# Patient Record
Sex: FEMALE | Race: WHITE | NOT HISPANIC OR LATINO | Employment: FULL TIME | URBAN - NONMETROPOLITAN AREA
[De-identification: names, ages, dates, MRNs, and addresses within clinical notes are randomized per-mention and may not be internally consistent; named-entity substitution may affect disease eponyms.]

---

## 2019-10-22 ENCOUNTER — TRANSCRIBE ORDERS (OUTPATIENT)
Dept: MAMMOGRAPHY | Facility: HOSPITAL | Age: 44
End: 2019-10-22

## 2019-10-22 DIAGNOSIS — Z12.39 BREAST CANCER SCREENING: Primary | ICD-10-CM

## 2019-11-04 ENCOUNTER — HOSPITAL ENCOUNTER (OUTPATIENT)
Dept: MAMMOGRAPHY | Facility: HOSPITAL | Age: 44
Discharge: HOME | End: 2019-11-04
Admitting: NURSE PRACTITIONER

## 2019-11-04 DIAGNOSIS — Z12.39 BREAST CANCER SCREENING: ICD-10-CM

## 2019-11-04 PROCEDURE — 77063 BREAST TOMOSYNTHESIS BI: CPT

## 2019-11-04 PROCEDURE — 77067 SCR MAMMO BI INCL CAD: CPT

## 2019-11-18 ENCOUNTER — TRANSCRIBE ORDERS (OUTPATIENT)
Dept: ADMINISTRATIVE | Facility: HOSPITAL | Age: 44
End: 2019-11-18

## 2019-11-18 DIAGNOSIS — R92.8 ABNORMAL MAMMOGRAM OF BOTH BREASTS: Primary | ICD-10-CM

## 2019-11-22 ENCOUNTER — HOSPITAL ENCOUNTER (OUTPATIENT)
Dept: ULTRASOUND IMAGING | Facility: HOSPITAL | Age: 44
Discharge: HOME | End: 2019-11-22
Admitting: NURSE PRACTITIONER

## 2019-11-22 DIAGNOSIS — R92.8 ABNORMAL MAMMOGRAM OF BOTH BREASTS: ICD-10-CM

## 2019-11-22 PROCEDURE — 76641 ULTRASOUND BREAST COMPLETE: CPT

## 2024-09-26 ENCOUNTER — OFFICE VISIT (OUTPATIENT)
Dept: CARDIOLOGY | Facility: CLINIC | Age: 49
End: 2024-09-26
Payer: COMMERCIAL

## 2024-09-26 VITALS
HEART RATE: 101 BPM | BODY MASS INDEX: 29.44 KG/M2 | OXYGEN SATURATION: 100 % | WEIGHT: 160 LBS | DIASTOLIC BLOOD PRESSURE: 80 MMHG | HEIGHT: 62 IN | SYSTOLIC BLOOD PRESSURE: 132 MMHG

## 2024-09-26 DIAGNOSIS — R06.09 DOE (DYSPNEA ON EXERTION): Primary | ICD-10-CM

## 2024-09-26 DIAGNOSIS — R00.2 PALPITATIONS: ICD-10-CM

## 2024-09-26 DIAGNOSIS — R03.0 ELEVATED BLOOD PRESSURE READING WITHOUT DIAGNOSIS OF HYPERTENSION: ICD-10-CM

## 2024-09-26 DIAGNOSIS — R94.31 ABNORMAL ECG: ICD-10-CM

## 2024-09-26 DIAGNOSIS — R60.0 BILATERAL LEG EDEMA: ICD-10-CM

## 2024-09-26 PROCEDURE — 99204 OFFICE O/P NEW MOD 45 MIN: CPT | Performed by: INTERNAL MEDICINE

## 2024-09-26 PROCEDURE — 93000 ELECTROCARDIOGRAM COMPLETE: CPT | Performed by: INTERNAL MEDICINE

## 2024-09-26 RX ORDER — CYCLOSPORINE 0.5 MG/ML
1 EMULSION OPHTHALMIC
COMMUNITY

## 2024-11-22 ENCOUNTER — OFFICE VISIT (OUTPATIENT)
Dept: OBSTETRICS AND GYNECOLOGY | Facility: CLINIC | Age: 49
End: 2024-11-22
Payer: COMMERCIAL

## 2024-11-22 VITALS
SYSTOLIC BLOOD PRESSURE: 114 MMHG | DIASTOLIC BLOOD PRESSURE: 68 MMHG | HEIGHT: 62 IN | BODY MASS INDEX: 29.26 KG/M2 | WEIGHT: 159 LBS

## 2024-11-22 DIAGNOSIS — Z12.4 ENCOUNTER FOR PAPANICOLAOU SMEAR FOR CERVICAL CANCER SCREENING: ICD-10-CM

## 2024-11-22 DIAGNOSIS — K76.89 LIVER CYST: ICD-10-CM

## 2024-11-22 DIAGNOSIS — Z01.419 WELL WOMAN EXAM WITH ROUTINE GYNECOLOGICAL EXAM: Primary | ICD-10-CM

## 2024-11-22 RX ORDER — AMOXICILLIN 500 MG
1 CAPSULE ORAL EVERY 12 HOURS SCHEDULED
COMMUNITY
Start: 2024-11-21

## 2024-11-22 RX ORDER — ERGOCALCIFEROL 1.25 MG/1
CAPSULE, LIQUID FILLED ORAL
COMMUNITY
Start: 2024-11-21

## 2024-11-22 RX ORDER — LIFITEGRAST 50 MG/ML
SOLUTION/ DROPS OPHTHALMIC EVERY 12 HOURS SCHEDULED
COMMUNITY

## 2024-11-22 RX ORDER — HYOSCYAMINE SULFATE 0.125 MG
TABLET ORAL
COMMUNITY

## 2024-11-26 LAB — REF LAB TEST METHOD: NORMAL

## 2024-12-05 NOTE — PROGRESS NOTES
Annual Well Woman Visit    Subjective   Chief Complaint   Patient presents with    Gynecologic Exam     Last pap 2019 WNL, Last Mammogram  ( Has order for new one)     Elba Olivia is a 49 y.o. year old  presenting to be seen for an annual well woman visit.    No complaints.      A liver cyst was noted on outside imaging and she is unsure about follow-up.    She denies sexual dysfunction. She denies urinary complaints including incontinence.    OB Hx:  x 4  Contraception: BTL  Pap smear: 2019  Mammogram:  - new one pending    Past Medical History:   Diagnosis Date    Abnormal ECG 2024    Endometriosis     Hyperlipidemia     Hypertension     IBS (irritable bowel syndrome)     Migraine 1986    Varicella      Past Surgical History:   Procedure Laterality Date    BREAST BIOPSY  2019    TUBAL ABDOMINAL LIGATION      WISDOM TOOTH EXTRACTION       Family History   Problem Relation Age of Onset    Hypertension Mother     Diabetes Paternal Grandfather     Uterine cancer Paternal Grandmother     Diabetes Paternal Grandmother      Social History     Tobacco Use    Smoking status: Never    Smokeless tobacco: Never   Vaping Use    Vaping status: Never Used   Substance Use Topics    Alcohol use: Not Currently    Drug use: Never     (Not in a hospital admission)    Patient has no known allergies.  Current Outpatient Medications on File Prior to Visit   Medication Sig Dispense Refill    Omega-3 Fatty Acids (fish oil) 1200 MG capsule capsule Take 1 capsule by mouth Every 12 (Twelve) Hours.      vitamin D (ERGOCALCIFEROL) 1.25 MG (91588 UT) capsule capsule       hyoscyamine (ANASPAZ,LEVSIN) 0.125 MG tablet Every 4 (Four) Hours.      Lifitegrast (Xiidra) 5 % ophthalmic solution Every 12 (Twelve) Hours.       No current facility-administered medications on file prior to visit.     Social History    Tobacco Use      Smoking status: Never      Smokeless tobacco: Never      Review of  "Systems  Pertinent items are noted in HPI, all other systems were reviewed and negative       Objective   /68   Ht 157.5 cm (62\")   Wt 72.1 kg (159 lb)   BMI 29.08 kg/m²     Physical Exam:  General Appearance: alert, pleasant, appears stated age, interactive and cooperative  Breasts: Examined in supine position  Symmetric without masses or skin dimpling  Nipples normal without inversion, lesions or discharge  There are no palpable axillary nodes  Abdomen: no masses, no hepatomegaly, no splenomegaly, soft non-tender, no guarding and no rebound tenderness    Pelvis:  Pelvic: Clinical staff was present for exam  External genitalia:  normal appearance of the external genitalia including Bartholin's and [place]'s glands.  :  urethral meatus normal;  Vagina:  normal pink mucosa without prolapse or lesions.  Cervix:  normal appearance.  Uterus:  normal size, shape and consistency.  Adnexa:  normal bimanual exam of the adnexa.  Rectal:  digital rectal exam not performed; anus visually normal appearing.       Assessment   Annual well woman exam with age appropriate screening  Liver cyst     Plan    Orders Placed This Encounter   Procedures    US Liver     Standing Status:   Future     Standing Expiration Date:   11/22/2025     Order Specific Question:   Reason for Exam:     Answer:   Liver cyst noted on echo     Order Specific Question:   Release to patient     Answer:   Routine Release [[id number]]     Medications ordered: None    Procedures performed: Pap smear    - Mammogram: Repeat yearly  - Pap screening guidelines reviewed; pap smear collected today  - Yearly clinical breast and pelvic exams recommended regardless of pap recommendations  - Dexa scan: not indicated   - Colonoscopy: not indicated   - Contraception: BTL  - Screening: none    Follow up for annual visits    Jalil Alvarez MD  Obstetrics and Gynecology  [facility]   "